# Patient Record
Sex: FEMALE | Race: WHITE | NOT HISPANIC OR LATINO | Employment: OTHER | URBAN - METROPOLITAN AREA
[De-identification: names, ages, dates, MRNs, and addresses within clinical notes are randomized per-mention and may not be internally consistent; named-entity substitution may affect disease eponyms.]

---

## 2017-04-26 ENCOUNTER — TELEPHONE (OUTPATIENT)
Dept: TRANSPLANT | Facility: CLINIC | Age: 42
End: 2017-04-26

## 2017-04-26 NOTE — TELEPHONE ENCOUNTER
Pt records reviewed.  Pt will be referred to Hepatology due to Cholestatis  Initial referral received  From Kevin Salazar's  office.   Referral letter sent to provider and patient.  Pt records reviewed.

## 2017-04-26 NOTE — LETTER
April 26, 2017    Elinor Rojas  [address]  [place] [address]      Dear Elinor Rojas:    Your doctor has referred you to the [facility]. You will be contacted by our office and an initial appointment will then be scheduled for you.    We look forward to seeing you soon. If you have any further questions, please contact us at [phone].       Sincerely,        [facility]   [address] [place] [address] [phone]

## 2017-04-26 NOTE — LETTER
April 26, 2017    Kevin Salazar MD  [address]  [facility] [address]      Dear Dr. Salazar    Patient: Elinor Rojas   MR Number: [id number]   YOB: 1975     Thank you for the referral of Elinor Rojas to the [facility]. An initial appointment will be scheduled for your patient with one of our Hepatologists.      Thank you again for your trust in our program.  If there is anything we can do for you or your staff, please feel free to contact us.        Sincerely,        [facility]  [address] [place] [id number]  [phone]

## 2017-04-28 ENCOUNTER — TELEPHONE (OUTPATIENT)
Dept: TRANSPLANT | Facility: CLINIC | Age: 42
End: 2017-04-28

## 2017-04-28 NOTE — TELEPHONE ENCOUNTER
----- Message from Sandra Aguirre sent at 4/27/2017  5:18 PM CDT -----  Pt states that she's going to [place].

## 2017-11-28 ENCOUNTER — LAB VISIT (OUTPATIENT)
Dept: LAB | Facility: HOSPITAL | Age: 42
End: 2017-11-28
Attending: INTERNAL MEDICINE
Payer: MEDICARE

## 2017-11-28 ENCOUNTER — OFFICE VISIT (OUTPATIENT)
Dept: HEPATOLOGY | Facility: CLINIC | Age: 42
End: 2017-11-28
Payer: MEDICARE

## 2017-11-28 ENCOUNTER — TELEPHONE (OUTPATIENT)
Dept: HEPATOLOGY | Facility: CLINIC | Age: 42
End: 2017-11-28

## 2017-11-28 VITALS
DIASTOLIC BLOOD PRESSURE: 79 MMHG | RESPIRATION RATE: 20 BRPM | HEIGHT: 66 IN | WEIGHT: 293 LBS | BODY MASS INDEX: 47.09 KG/M2 | TEMPERATURE: 98 F | OXYGEN SATURATION: 98 % | HEART RATE: 64 BPM | SYSTOLIC BLOOD PRESSURE: 166 MMHG

## 2017-11-28 DIAGNOSIS — R74.8 ELEVATED LIVER ENZYMES: Primary | ICD-10-CM

## 2017-11-28 DIAGNOSIS — K83.1 CHOLESTASIS: ICD-10-CM

## 2017-11-28 DIAGNOSIS — L29.9 PRURITUS: ICD-10-CM

## 2017-11-28 DIAGNOSIS — R74.8 ELEVATED LIVER ENZYMES: ICD-10-CM

## 2017-11-28 DIAGNOSIS — K74.00 HEPATIC FIBROSIS: ICD-10-CM

## 2017-11-28 DIAGNOSIS — K76.0 FATTY LIVER DISEASE, NONALCOHOLIC: ICD-10-CM

## 2017-11-28 LAB
ALBUMIN SERPL BCP-MCNC: 3.6 G/DL
ALP SERPL-CCNC: 170 U/L
ALT SERPL W/O P-5'-P-CCNC: 47 U/L
AST SERPL-CCNC: 43 U/L
BILIRUB DIRECT SERPL-MCNC: 0.4 MG/DL
BILIRUB SERPL-MCNC: 0.9 MG/DL
BUN SERPL-MCNC: 15 MG/DL
CREAT SERPL-MCNC: 0.8 MG/DL
EST. GFR  (AFRICAN AMERICAN): >60 ML/MIN/1.73 M^2
EST. GFR  (NON AFRICAN AMERICAN): >60 ML/MIN/1.73 M^2
IGA SERPL-MCNC: 250 MG/DL
IGG SERPL-MCNC: 1302 MG/DL
IGM SERPL-MCNC: 70 MG/DL
INR PPP: 0.9
PROT SERPL-MCNC: 8.1 G/DL
PROTHROMBIN TIME: 9.7 SEC

## 2017-11-28 PROCEDURE — 82787 IGG 1 2 3 OR 4 EACH: CPT

## 2017-11-28 PROCEDURE — 86038 ANTINUCLEAR ANTIBODIES: CPT

## 2017-11-28 PROCEDURE — 84520 ASSAY OF UREA NITROGEN: CPT

## 2017-11-28 PROCEDURE — 86235 NUCLEAR ANTIGEN ANTIBODY: CPT | Mod: 91

## 2017-11-28 PROCEDURE — 86256 FLUORESCENT ANTIBODY TITER: CPT

## 2017-11-28 PROCEDURE — 80076 HEPATIC FUNCTION PANEL: CPT

## 2017-11-28 PROCEDURE — 82784 ASSAY IGA/IGD/IGG/IGM EACH: CPT | Mod: 59

## 2017-11-28 PROCEDURE — 82565 ASSAY OF CREATININE: CPT

## 2017-11-28 PROCEDURE — 99204 OFFICE O/P NEW MOD 45 MIN: CPT | Mod: S$PBB,,, | Performed by: INTERNAL MEDICINE

## 2017-11-28 PROCEDURE — 99214 OFFICE O/P EST MOD 30 MIN: CPT | Mod: PBBFAC | Performed by: INTERNAL MEDICINE

## 2017-11-28 PROCEDURE — 99999 PR PBB SHADOW E&M-EST. PATIENT-LVL IV: CPT | Mod: PBBFAC,,, | Performed by: INTERNAL MEDICINE

## 2017-11-28 PROCEDURE — 85610 PROTHROMBIN TIME: CPT

## 2017-11-28 RX ORDER — OMEPRAZOLE 20 MG/1
20 TABLET, DELAYED RELEASE ORAL DAILY
COMMUNITY

## 2017-11-28 RX ORDER — HYDROXYZINE HYDROCHLORIDE 25 MG/1
25 TABLET, FILM COATED ORAL EVERY 6 HOURS PRN
COMMUNITY

## 2017-11-28 RX ORDER — ALPRAZOLAM 1 MG/1
1 TABLET ORAL 2 TIMES DAILY PRN
COMMUNITY
Start: 2017-11-08

## 2017-11-28 NOTE — PROGRESS NOTES
[facility]    CC: Abnormal liver tests and pruritus    HPI 42 y.o. female here with mild, persistent, fluctuating abnormal liver tests associated with pruritus.  This all began last March, when she had the onset of severe itching with subsequent scleral icterus.  Prior to that, there was no significant health issues and she denies the use of any supplements, heavy ETOH use, illicit drugs, or any other disposing factors to acute liver injury.  She did take Bactrim for a sore throat at some point.  After he symptoms started, she underwent a thorough workup including MRCP, liver biopsy, and serologies for causes cirrhosis.  She was told she likely had NAFLD and was instructed to loose weight and improve her lifestyle.  Since the, she has had several flares of itching and she continues to have mild pruritus at this time. She denies any overt GI bleeding, confusion, abdominal distension, current jaundice, or any other related complaints at this time.    Liver biopsy from 3/23/17: subacute cholestasis with periportal fibrosis with no evidence of cirrhosis.     Past records reviewed.  Collateral information obtained from family in the room.       Past Medical History:  Anxiety  Vit D deficiency    Past Surgical History  Cholecystectomy  MICHA    Social History  Social History   Substance Use Topics    Smoking status: Current Some Day Smoker     Packs/day: 2.00     Types: Cigarettes     Start date: 1995    Smokeless tobacco: Never Used      Comment: Lives in [facility]    Alcohol use Not on file         Family History  Mother who  of JORDAN cirrhosis    Review of Systems  General ROS: negative for chills, fever or weight loss  Psychological ROS: negative for hallucination, depression or suicidal ideation  Ophthalmic ROS: negative for blurry vision, photophobia or eye pain  ENT ROS: negative for epistaxis, sore throat or rhinorrhea  Respiratory ROS: no cough, shortness of breath, or  "wheezing  Cardiovascular ROS: no chest pain or dyspnea on exertion  Gastrointestinal ROS: mild, intermittent abdominal pain, no change in bowel habits, or black/ bloody stools  Genito-Urinary ROS: no dysuria, trouble voiding, or hematuria  Musculoskeletal ROS: negative for gait disturbance or muscular weakness  Neurological ROS: no syncope or seizures; no ataxia  Dermatological ROS:positive for pruritus, no rash or jaundice at this time    Physical Examination  BP (!) 166/79 (BP Location: Left arm, Patient Position: Sitting, BP Method: X-Large (Automatic))   Pulse 64   Temp 97.8 °F (36.6 °C) (Oral)   Resp 20   Ht 5' 6" (1.676 m)   Wt (!) 141.6 kg (312 lb 2.7 oz)   SpO2 98%   BMI 50.39 kg/m²   General appearance: alert, cooperative, no distress  HENT: Normocephalic, atraumatic, neck symmetrical, no nasal discharge   Eyes: conjunctivae/corneas clear, PERRL, EOM's intact  Lungs: clear to auscultation bilaterally, no dullness to percussion bilaterally  Heart: regular rate and rhythm without rub; no displacement of the PMI   Abdomen: soft, non-tender; bowel sounds normoactive; no organomegaly  Extremities: extremities symmetric; no clubbing, cyanosis, or edema  Integument: Skin color, texture, turgor normal; no rashes; hair distrubution normal  Neurologic: Alert and oriented X 3, normal strength, normal coordination and gait  Psychiatric: no pressured speech; normal affect; no evidence of impaired cognition     Labs: 10/26  H/H: 13/39  Plt: 346  AST/ALT: 91/163  T bili: 1.4  Alk Phos: 163  Ferritin: 104  ASMA: negative  AMA: negative  Ceruloplasmin : normal  Viral hepatitis studies: normal    Imaging: MRCP reviewed from 3/2017 and was normal.     Assessment:   42 year old female with abnormal liver tests associated with pruritus and fibrosis noted on biopsy.  Based on the available information, the most likely etiology of her abnormal liver tests is JORDAN, although seronegative PBC cannot be excluded.  We will plan " to repeat liver tests and AMA/ASMA today and will review previous liver biopsy with pathology here.  Depending on the findings and quality of the sample, repeat liver biopsy may be necessary. She was counseled on weight loss and an overall healthy lifestyle given that there is some component of JORDAN.    Plan:   1. Repeat liver tests and autoimmune serologies today  2. Obtain biopsy samples and review with pathology

## 2017-11-28 NOTE — PATIENT INSTRUCTIONS
- please get lab works done today  - will obtain liver biopsy slides and review at [facility]  - Dr. Lovett will call you with the result  - return in 3 weeks to discuss the results    - life-style modifications: weight loss, daily exercise (30 mins of HIIT or cardio), low carb/low fat diet  - control of diabetes or insulin resistance   - control of high cholesterol, if needed with statin drugs or other cholesterol-lowering agents  - coffee consumption (low caloric): 2-3 cups per day may reduce liver fat

## 2017-11-28 NOTE — LETTER
November 28, 2017        Roe Christensen MD  [address]  [place] [address]             [facility] - Hepatology  [address]  [place] [address]  Phone: [phone]  Fax: [phone]   Patient: Elinor Rojsa   MR Number: [id number]   YOB: 1975   Date of Visit: 11/28/2017       Dear Dr. Christensen:    Thank you for referring Elinor Rojas to me for evaluation. Attached you will find relevant portions of my assessment and plan of care.    If you have questions, please do not hesitate to call me. I look forward to following Elinor Rojas along with you.    Sincerely,      Sera Lovett MD            CC  No Recipients    Enclosure

## 2017-11-28 NOTE — TELEPHONE ENCOUNTER
FAXED AUHTORIZATION FOR RELEASE OF INFO TO THE PATHOLOGY LAB REQUESTING LIVER BIOPSY SLIDES TO BE MAIL TO US    PHONE# [phone]  FAX# [phone]    FED EX INFO WAS GIVEN. RENEA

## 2017-11-29 LAB
ANA SER QL IF: NORMAL
MITOCHONDRIA AB TITR SER IF: NORMAL {TITER}
SMOOTH MUSCLE AB TITR SER IF: NORMAL {TITER}

## 2017-11-30 LAB — IGG4 SER-MCNC: 21 MG/DL

## 2017-12-01 ENCOUNTER — TELEPHONE (OUTPATIENT)
Dept: HEPATOLOGY | Facility: CLINIC | Age: 42
End: 2017-12-01

## 2017-12-01 DIAGNOSIS — K74.00 HEPATIC FIBROSIS: ICD-10-CM

## 2017-12-01 DIAGNOSIS — K83.1 CHOLESTASIS: ICD-10-CM

## 2017-12-01 DIAGNOSIS — K76.0 NAFLD (NONALCOHOLIC FATTY LIVER DISEASE): ICD-10-CM

## 2017-12-01 DIAGNOSIS — R74.8 ELEVATED LIVER ENZYMES: Primary | ICD-10-CM

## 2017-12-02 NOTE — TELEPHONE ENCOUNTER
LIVER BIOPSY SLIDES X 8. -2676. COLLECTED ON 3/23/2017. NEEDED FOR 2ND OPINION. THE PATHOLOGY LABORATORY. [address] [facility] [place] [address]. PH# [phone] AND FAX # [phone].  FED EX # [id number]. SLIDES TO [facility] W/PATH REPORT.    Copy to Dr Sera Lovett, Carolyn Dumont RN and original to media.

## 2017-12-05 PROCEDURE — 88321 CONSLTJ&REPRT SLD PREP ELSWR: CPT | Mod: ,,, | Performed by: PATHOLOGY

## 2017-12-21 ENCOUNTER — CONFERENCE (OUTPATIENT)
Dept: TRANSPLANT | Facility: CLINIC | Age: 42
End: 2017-12-21

## 2017-12-22 ENCOUNTER — TELEPHONE (OUTPATIENT)
Dept: HEPATOLOGY | Facility: CLINIC | Age: 42
End: 2017-12-22

## 2017-12-22 ENCOUNTER — TELEPHONE (OUTPATIENT)
Dept: TRANSPLANT | Facility: CLINIC | Age: 42
End: 2017-12-22

## 2017-12-22 DIAGNOSIS — K74.3 PRIMARY BILIARY CHOLANGITIS: Primary | ICD-10-CM

## 2017-12-22 DIAGNOSIS — R74.8 ELEVATED LIVER ENZYMES: Primary | ICD-10-CM

## 2017-12-22 RX ORDER — URSODIOL 500 MG/1
500 TABLET, FILM COATED ORAL 2 TIMES DAILY
Qty: 60 TABLET | Refills: 11 | Status: SHIPPED | OUTPATIENT
Start: 2017-12-22 | End: 2018-02-02

## 2017-12-22 NOTE — TELEPHONE ENCOUNTER
Called to inform of lab results and liver pathology review ( Path Conference). No answer, left a voice mail.    Recommend:  - labs: ceruloplasmin due to some copper deposits found in biopsy  - start on ursodiol 500 mg BID due to elevated alkaline phosphatase, suspicion for PBC    Patient's pharmacy is not listed. I was not able to send the prescription.

## 2017-12-22 NOTE — TELEPHONE ENCOUNTER
Spoke with patient, informed her of labs/path conference. Will start on UDCA 500 mg BID, patient expressed understanidng. Script sent to [facility] in [place], [place] .    Return in 1 month with labs.

## 2017-12-22 NOTE — TELEPHONE ENCOUNTER
12/21/17: Liver Pathology Conference:    Liver Biopsy: focal area of ballooning/ not much fibrosis/ minimal copper/ ?? BD obstruction

## 2018-01-03 DIAGNOSIS — R74.8 ELEVATED LIVER ENZYMES: Primary | ICD-10-CM

## 2018-01-03 DIAGNOSIS — K76.0 FATTY LIVER DISEASE, NONALCOHOLIC: ICD-10-CM

## 2018-01-03 DIAGNOSIS — K74.00 HEPATIC FIBROSIS: ICD-10-CM

## 2018-01-03 DIAGNOSIS — K83.1 CHOLESTASIS: ICD-10-CM

## 2018-02-02 ENCOUNTER — LAB VISIT (OUTPATIENT)
Dept: LAB | Facility: HOSPITAL | Age: 43
End: 2018-02-02
Attending: INTERNAL MEDICINE
Payer: MEDICARE

## 2018-02-02 ENCOUNTER — OFFICE VISIT (OUTPATIENT)
Dept: HEPATOLOGY | Facility: CLINIC | Age: 43
End: 2018-02-02
Payer: MEDICARE

## 2018-02-02 VITALS
BODY MASS INDEX: 47.09 KG/M2 | OXYGEN SATURATION: 97 % | HEART RATE: 85 BPM | RESPIRATION RATE: 18 BRPM | SYSTOLIC BLOOD PRESSURE: 164 MMHG | DIASTOLIC BLOOD PRESSURE: 82 MMHG | HEIGHT: 66 IN | WEIGHT: 293 LBS

## 2018-02-02 DIAGNOSIS — H04.123 DRY EYES: ICD-10-CM

## 2018-02-02 DIAGNOSIS — K76.0 FATTY LIVER DISEASE, NONALCOHOLIC: ICD-10-CM

## 2018-02-02 DIAGNOSIS — K83.1 CHOLESTASIS: ICD-10-CM

## 2018-02-02 DIAGNOSIS — L29.9 PRURITUS: ICD-10-CM

## 2018-02-02 DIAGNOSIS — R74.8 ELEVATED LIVER ENZYMES: ICD-10-CM

## 2018-02-02 DIAGNOSIS — K74.3 PRIMARY BILIARY CHOLANGITIS: Primary | ICD-10-CM

## 2018-02-02 DIAGNOSIS — K74.00 HEPATIC FIBROSIS: ICD-10-CM

## 2018-02-02 LAB
A1AT SERPL-MCNC: 144 MG/DL
ALBUMIN SERPL BCP-MCNC: 3.8 G/DL
ALP SERPL-CCNC: 153 U/L
ALT SERPL W/O P-5'-P-CCNC: 26 U/L
ANION GAP SERPL CALC-SCNC: 12 MMOL/L
AST SERPL-CCNC: 30 U/L
BASOPHILS # BLD AUTO: 0.09 K/UL
BASOPHILS NFR BLD: 0.9 %
BILIRUB SERPL-MCNC: 1.4 MG/DL
BUN SERPL-MCNC: 17 MG/DL
CALCIUM SERPL-MCNC: 9.5 MG/DL
CERULOPLASMIN SERPL-MCNC: 29 MG/DL
CHLORIDE SERPL-SCNC: 104 MMOL/L
CO2 SERPL-SCNC: 26 MMOL/L
CREAT SERPL-MCNC: 0.8 MG/DL
DIFFERENTIAL METHOD: ABNORMAL
EOSINOPHIL # BLD AUTO: 0.3 K/UL
EOSINOPHIL NFR BLD: 2.8 %
ERYTHROCYTE [DISTWIDTH] IN BLOOD BY AUTOMATED COUNT: 12.7 %
EST. GFR  (AFRICAN AMERICAN): >60 ML/MIN/1.73 M^2
EST. GFR  (NON AFRICAN AMERICAN): >60 ML/MIN/1.73 M^2
GLUCOSE SERPL-MCNC: 94 MG/DL
HCT VFR BLD AUTO: 41.2 %
HGB BLD-MCNC: 14.1 G/DL
IMM GRANULOCYTES # BLD AUTO: 0.04 K/UL
IMM GRANULOCYTES NFR BLD AUTO: 0.4 %
INR PPP: 0.9
LYMPHOCYTES # BLD AUTO: 3.7 K/UL
LYMPHOCYTES NFR BLD: 36.1 %
MCH RBC QN AUTO: 28.8 PG
MCHC RBC AUTO-ENTMCNC: 34.2 G/DL
MCV RBC AUTO: 84 FL
MONOCYTES # BLD AUTO: 0.6 K/UL
MONOCYTES NFR BLD: 5.5 %
NEUTROPHILS # BLD AUTO: 5.5 K/UL
NEUTROPHILS NFR BLD: 54.3 %
NRBC BLD-RTO: 0 /100 WBC
PLATELET # BLD AUTO: 354 K/UL
PMV BLD AUTO: 10.6 FL
POTASSIUM SERPL-SCNC: 3.8 MMOL/L
PROT SERPL-MCNC: 8.4 G/DL
PROTHROMBIN TIME: 9.9 SEC
RBC # BLD AUTO: 4.89 M/UL
SODIUM SERPL-SCNC: 142 MMOL/L
WBC # BLD AUTO: 10.18 K/UL

## 2018-02-02 PROCEDURE — 85610 PROTHROMBIN TIME: CPT

## 2018-02-02 PROCEDURE — 85025 COMPLETE CBC W/AUTO DIFF WBC: CPT

## 2018-02-02 PROCEDURE — 80053 COMPREHEN METABOLIC PANEL: CPT

## 2018-02-02 PROCEDURE — 82390 ASSAY OF CERULOPLASMIN: CPT

## 2018-02-02 PROCEDURE — 99999 PR PBB SHADOW E&M-EST. PATIENT-LVL IV: CPT | Mod: PBBFAC,,, | Performed by: INTERNAL MEDICINE

## 2018-02-02 PROCEDURE — 99214 OFFICE O/P EST MOD 30 MIN: CPT | Mod: PBBFAC | Performed by: INTERNAL MEDICINE

## 2018-02-02 PROCEDURE — 36415 COLL VENOUS BLD VENIPUNCTURE: CPT

## 2018-02-02 PROCEDURE — 99215 OFFICE O/P EST HI 40 MIN: CPT | Mod: S$PBB,,, | Performed by: INTERNAL MEDICINE

## 2018-02-02 RX ORDER — URSODIOL 300 MG/1
300 CAPSULE ORAL 2 TIMES DAILY
Qty: 60 CAPSULE | Refills: 11 | Status: SHIPPED | OUTPATIENT
Start: 2018-02-02 | End: 2019-02-15 | Stop reason: SDUPTHER

## 2018-02-02 NOTE — PROGRESS NOTES
"Hepatology Note    Referring provider: Kevin Salazar MD    Lab: Outpatient Lab Draw Station: [address] [facility] [address], [place] [address]  Phone: [phone]    Chief Complaint   Patient presents with    Follow-up       HPI:  Elinor Rojas is a pleasant 42 y.o. femalewho was referred to Hepatology Clinic for consultation of had concerns including Follow-up..      2/2/18:   Reviewed [facility] conference review with patient, explained to her that she likely has "seronegative PBC". Patient tried UDCA 500 mg BID and could not tolerate due to abdominal pain, now taking only once a day - still with some abd pain, denies n/v. Feeling itching, fatigue and dry eyes. Denies GI bleed, leg edema, confusion.    FINAL PATHOLOGIC DIAGNOSIS  Outside slides ([facility], procedure date: 3/23/2017)  Liver, random, biopsy:  - Low-grade ductular reaction with mild portal neutrophilia and minimal periportal copper deposition.  - Minimal steatosis, macrovesicular 5% and microvesicular 10%.  - Features of steatohepatitis with pericellular fibrosis and occasional ballooning hepatocyte.  - Periportal and pericellular fibrosis with septae formation, stage 2 (of 4).  - See comment.  COMMENT: The biopsy is adequate for evaluation. The biopsy shows a low-grade ductular reaction with mild portal  neutrophilia seen on CK 7 and immunostain. There is minimal periportal copper deposition on copper stain  indicating some degree of chronicity. There is no ductopenia seen. There are no granulomas seen. There is no  lymphocytic cholangitis seen. The differential diagnosis includes all various causes of mechanical (stones,  strictures, neoplasm, etc.) and/or immunologic (PSC, PBC, DILI, etc.) causes of suboptimal biliary drainage.  Although there is minimal steatosis seen on the biopsy, there are features indicating a degree of steatohepatitis  present with pericellular fibrosis and occasional ballooning hepatocytes. Trichrome stain shows " portal and  pericellular fibrosis with septae. PASD stain is negative. There is no increased iron on iron stain    Patient Active Problem List   Diagnosis    Class 3 obesity due to excess calories with serious comorbidity in adult    Elevated liver enzymes    Hepatic fibrosis    Fatty liver disease, nonalcoholic    Cholestasis    Pruritus       Past Medical History:   Diagnosis Date    GERD (gastroesophageal reflux disease)     Itching        History reviewed. No pertinent surgical history.    History reviewed. No pertinent family history.    Social History     Social History    Marital status:      Spouse name: N/A    Number of children: N/A    Years of education: N/A     Social History Main Topics    Smoking status: Current Some Day Smoker     Packs/day: 2.00     Types: Cigarettes     Start date: 11/28/1995    Smokeless tobacco: Never Used      Comment: Lives in [facility]    Alcohol use None    Drug use: Unknown    Sexual activity: Not Asked     Other Topics Concern    None     Social History Narrative    None       Current Outpatient Prescriptions   Medication Sig Dispense Refill    omeprazole (PRILOSEC OTC) 20 MG tablet Take 20 mg by mouth once daily.      ursodiol (ACTIGALL) 500 MG tablet Take 1 tablet (500 mg total) by mouth 2 (two) times daily. 60 tablet 11    ALPRAZolam (XANAX) 1 MG tablet Take 1 mg by mouth 2 (two) times daily as needed.      hydrOXYzine HCl (ATARAX) 25 MG tablet Take 25 mg by mouth every 6 (six) hours as needed for Itching.       No current facility-administered medications for this visit.        Review of patient's allergies indicates:   Allergen Reactions    Thorazine [chlorpromazine] Other (See Comments)     Medicine causes her to go in a trance and just stare.        Review of Systems   Constitutional: Positive for malaise/fatigue. Negative for chills, fever and weight loss.   HENT: Negative for congestion, nosebleeds and sore throat.    Eyes:  "Negative for blurred vision, double vision and photophobia.   Respiratory: Negative for cough and shortness of breath.    Cardiovascular: Negative for chest pain, palpitations, orthopnea and leg swelling.   Gastrointestinal: Negative for abdominal pain, blood in stool, constipation, diarrhea, melena and vomiting.   Genitourinary: Negative for dysuria.   Musculoskeletal: Negative for falls and joint pain.   Skin: Positive for itching. Negative for rash.   Neurological: Negative for dizziness, tremors and weakness.   Endo/Heme/Allergies: Does not bruise/bleed easily.   Psychiatric/Behavioral: Negative for depression and substance abuse. The patient is not nervous/anxious and does not have insomnia.        Vitals:    02/02/18 1430   BP: (!) 164/82   Pulse: 85   Resp: 18   SpO2: 97%   Weight: (!) 145 kg (319 lb 10.7 oz)   Height: 5' 6" (1.676 m)       Physical Exam   Constitutional: She is oriented to person, place, and time. She appears well-developed and well-nourished.   HENT:   Head: Normocephalic and atraumatic.   Mouth/Throat: Oropharynx is clear and moist.   Eyes: Conjunctivae are normal. No scleral icterus.   Neck: Normal range of motion.   Cardiovascular: Normal rate, regular rhythm and normal heart sounds.    Pulmonary/Chest: Effort normal and breath sounds normal. No respiratory distress. She has no rales.   Abdominal: Soft. Bowel sounds are normal. She exhibits no distension. There is no tenderness. No hernia.   Musculoskeletal: She exhibits no edema.   Lymphadenopathy:     She has no cervical adenopathy.   Neurological: She is alert and oriented to person, place, and time.   Skin: Skin is warm and dry. No rash noted.   Psychiatric: She has a normal mood and affect. Her behavior is normal. Her mood appears not anxious.   Nursing note and vitals reviewed.      LABS: I personally reviewed pertinent laboratory findings.    Lab Results   Component Value Date    ALT 26 02/02/2018    AST 30 02/02/2018    ALKPHOS " 153 (H) 02/02/2018    BILITOT 1.4 (H) 02/02/2018       No results found for: WBC, HGB, HCT, MCV, PLT    Lab Results   Component Value Date    BUN 15 11/28/2017    CREATININE 0.8 11/28/2017    ESTGFRAFRICA >60.0 11/28/2017    EGFRNONAA >60.0 11/28/2017       No results found for: HAV, HEPAIGM, HEPBIGM, HEPBCAB, HBEAG, HEPCAB    Lab Results   Component Value Date    SMOOTHMUSCAB Negative 1:40 11/28/2017       I personally reviewed all recent lab results.  I personally reviewed imaging studies.    Assessment:  42 y.o. female presenting with     1. Primary biliary cholangitis    2. Cholestasis    3. Class 3 obesity due to excess calories with serious comorbidity in adult    4. Pruritus    5. Hepatic fibrosis    6. Elevated liver enzymes    7. Fatty liver disease, nonalcoholic    8. Dry eyes      AMA: neg  However, the liver biopsy was reviewed here at [facility], cholestasis and ductular inflammation: likely PBC. She does have steatosis and steatohepatitis but mild. Fibrosis was determined stage 2.  She does have fatigue, pruritus and dry eyes which support the diagnosis of seronegative PBC.  She has started UDCA 500 mg BID - appropriate weight-based dosing, however experiencing side effect with abd pain, she decreased it to 500 mg QD and pain is better but still there.     -> 153     Recommendation(s):  - decrease UDCA dose to 300 mg BID, if tolerates  - if cannot tolerate UDCA, plan to stop it and start OCA  - artifical tear prn for dry eyes  - counseled for PBC and NAFLD  - avoid alcohol  - RTC 3 months, labs in 1 month    A total of 60 minutes were spent face-to-face with the patient during this encounter and over half of that time was spent on counseling and coordination of care.  We discussed in depth the nature of the patient's liver disease, the management plan in details. I also educated the patient about lifestyle modifications which may improve hepatic steatosis, overweight/obesity, insulin  resistance and high blood pressure issues. I have provided the patient with an opportunity to ask questions and have all questions answered to his satisfaction.     I have sent communication to the referring physician and/or primary care provider.    Sera Lovett MD  Staff Physician  Hepatology and Liver Transplant  [facility]  [facility]

## 2018-02-02 NOTE — PATIENT INSTRUCTIONS
- fill lower dose of Ursodiol - 300 mg - start 1 time daily, if tolerable, increase to twice daily  - stop ursodiol 500 mg  - call Dr. Lovett or email via [email or web address] and inform of side effect  - if you continue to have side effect which is abdominal pain, will try to switch to Ocaliva, new medication  - you may use artificial eye drops for dry eyes

## 2018-02-12 ENCOUNTER — TELEPHONE (OUTPATIENT)
Dept: TRANSPLANT | Facility: CLINIC | Age: 43
End: 2018-02-12

## 2018-02-12 NOTE — TELEPHONE ENCOUNTER
Will send lab orders to patient's local lab:    Lab: Outpatient Lab Draw Station: [address] [place] [address] [address], [place] [address]  Phone: [phone]

## 2018-02-13 ENCOUNTER — PATIENT MESSAGE (OUTPATIENT)
Dept: HEPATOLOGY | Facility: CLINIC | Age: 43
End: 2018-02-13

## 2018-02-21 ENCOUNTER — PATIENT MESSAGE (OUTPATIENT)
Dept: HEPATOLOGY | Facility: CLINIC | Age: 43
End: 2018-02-21

## 2018-02-21 LAB
ALBUMIN/GLOBULIN RATIO: 1.2
ALBUMIN: 4.3
ALP ISOS SERPL LEV INH-CCNC: 143 U/L
ALT SERPL-CCNC: 14 U/L
ANION GAP SERPL CALC-SCNC: 13 MMOL/L
AST SERPL-CCNC: 20 U/L
BASOPHILS ABSOLUTE COUNT: 0 /ΜL
BASOPHILS NFR BLD: 1 % (ref 0–3)
BILIRUBIN, TOTAL: 0.8
BUN BLD-MCNC: 13 MG/DL (ref 4–21)
BUN/CREAT SERPL: 23.6
CALCIUM SERPL-MCNC: 8.9 MG/DL
CARBON DIOXIDE, CO2: 27
CHLORIDE: 99
CREAT SERPL-MCNC: 0.6 MG/DL
EOSINOPHIL NFR BLD: 2.2 %
EOSINOPHILS ABSOLUTE COUNT: 0 /ΜL
ERYTHROCYTE [DISTWIDTH] IN BLOOD BY AUTOMATED COUNT: 39.2 %
GFR: 122.42
GLOBULIN: 3.6
GLUCOSE: 83
HCT VFR BLD AUTO: 41 % (ref 36–46)
HGB BLD-MCNC: 13.7 G/DL (ref 12–16)
IMMATURE GRANS (ABS): 0.02
IMMATURE GRANULOCYTES: 0.2
LYMPH%: 34 % (ref 18–52)
LYMPHOCYTES ABSOLUTE COUNT: 4 /?L
MCH RBC QN AUTO: 28.8 PG
MCHC RBC AUTO-ENTMCNC: 33.5 G/DL
MCV RBC AUTO: 86.1 FL
MONO%: 5 % (ref 3–10)
MONOCYTES ABSOLUTE COUNT: 0.52
NEUTROPHILS - MAN (DIFF): 58
NEUTROPHILS ABSOLUTE COUNT: 6 /ΜL
NRBC: 0
NUCLEATED RBC-MANUAL: 0
PLATELET # BLD AUTO: 412 K/ΜL (ref 150–399)
POTASSIUM: 4.1
PROT SERPL-MCNC: 7.9 G/DL
RBC # BLD AUTO: 4.75 10*6/UL
SODIUM: 139
VIT A SERPL-MCNC: 40 UG/ML
VITAMIN D, 25-OH, TOTAL: 17.3
WBC: 10.7

## 2018-03-14 ENCOUNTER — PATIENT MESSAGE (OUTPATIENT)
Dept: HEPATOLOGY | Facility: CLINIC | Age: 43
End: 2018-03-14

## 2018-03-15 ENCOUNTER — TELEPHONE (OUTPATIENT)
Dept: BARIATRICS | Facility: CLINIC | Age: 43
End: 2018-03-15

## 2018-03-16 ENCOUNTER — PATIENT MESSAGE (OUTPATIENT)
Dept: HEPATOLOGY | Facility: CLINIC | Age: 43
End: 2018-03-16

## 2018-03-16 NOTE — TELEPHONE ENCOUNTER
----- Message from Magdalena GRIDER Ephraim sent at 3/15/2018  5:36 PM CDT -----  Contact: PT Portal Request  Appointment Request From: Elinor Rojas    With Provider: Other - (see comments)    Would Accept With:Only the person I've selected    Preferred Date Range: From 3/20/2018 To 3/23/2018    Preferred Times: Any    Reason for visit: Request an Appt    Comments:  I have attended the Bariatric Surgery Seminar [place] Orientation. I need to schedule my consult visit. I was not able to recieve my packet to complete at home.

## 2018-03-16 NOTE — TELEPHONE ENCOUNTER
Called patient to schedule consult appointment. Patient understands date, time and location of future appointment.

## 2019-02-15 DIAGNOSIS — K74.3 PRIMARY BILIARY CHOLANGITIS: ICD-10-CM

## 2019-02-17 RX ORDER — URSODIOL 300 MG/1
CAPSULE ORAL
Qty: 60 CAPSULE | Refills: 11 | Status: SHIPPED | OUTPATIENT
Start: 2019-02-17

## 2022-07-28 ENCOUNTER — TELEPHONE (OUTPATIENT)
Dept: OBSTETRICS AND GYNECOLOGY | Facility: CLINIC | Age: 47
End: 2022-07-28
Payer: MEDICARE

## 2022-07-28 NOTE — TELEPHONE ENCOUNTER
----- Message from Lali Moreno NP sent at 7/28/2022 12:22 PM CDT -----  Regarding: pelvic US and appt please  This pt was seen in [place] today   She is a past pt of Dr. Loyd   She needs pelvic US with TVP and apt to see dr Loyd please  Thanks  Lali FERGUSON

## 2022-07-29 DIAGNOSIS — R10.2 PELVIC PAIN: Primary | ICD-10-CM

## 2022-08-01 DIAGNOSIS — R10.2 PELVIC PAIN: Primary | ICD-10-CM

## 2022-08-02 ENCOUNTER — PROCEDURE VISIT (OUTPATIENT)
Dept: OBSTETRICS AND GYNECOLOGY | Facility: CLINIC | Age: 47
End: 2022-08-02
Payer: MEDICARE

## 2022-08-02 ENCOUNTER — PATIENT MESSAGE (OUTPATIENT)
Dept: OBSTETRICS AND GYNECOLOGY | Facility: CLINIC | Age: 47
End: 2022-08-02

## 2022-08-02 ENCOUNTER — OFFICE VISIT (OUTPATIENT)
Dept: OBSTETRICS AND GYNECOLOGY | Facility: CLINIC | Age: 47
End: 2022-08-02
Payer: MEDICARE

## 2022-08-02 VITALS
WEIGHT: 274 LBS | BODY MASS INDEX: 44.22 KG/M2 | HEART RATE: 99 BPM | SYSTOLIC BLOOD PRESSURE: 151 MMHG | DIASTOLIC BLOOD PRESSURE: 95 MMHG

## 2022-08-02 DIAGNOSIS — R10.2 PELVIC PAIN: ICD-10-CM

## 2022-08-02 DIAGNOSIS — R10.30 LOWER ABDOMINAL PAIN: Primary | ICD-10-CM

## 2022-08-02 PROCEDURE — 99203 OFFICE O/P NEW LOW 30 MIN: CPT | Mod: 25,S$GLB,, | Performed by: OBSTETRICS & GYNECOLOGY

## 2022-08-02 PROCEDURE — 76856 US OB/GYN PROCEDURE (VIEWPOINT): ICD-10-PCS | Mod: S$GLB,,, | Performed by: OBSTETRICS & GYNECOLOGY

## 2022-08-02 PROCEDURE — 76830 US OB/GYN PROCEDURE (VIEWPOINT): ICD-10-PCS | Mod: S$GLB,,, | Performed by: OBSTETRICS & GYNECOLOGY

## 2022-08-02 PROCEDURE — 99203 PR OFFICE/OUTPT VISIT, NEW, LEVL III, 30-44 MIN: ICD-10-PCS | Mod: 25,S$GLB,, | Performed by: OBSTETRICS & GYNECOLOGY

## 2022-08-02 PROCEDURE — 76830 TRANSVAGINAL US NON-OB: CPT | Mod: S$GLB,,, | Performed by: OBSTETRICS & GYNECOLOGY

## 2022-08-02 PROCEDURE — 76856 US EXAM PELVIC COMPLETE: CPT | Mod: S$GLB,,, | Performed by: OBSTETRICS & GYNECOLOGY

## 2022-08-02 RX ORDER — CYCLOBENZAPRINE HYDROCHLORIDE 7.5 MG/1
7.5 TABLET, FILM COATED ORAL 3 TIMES DAILY
Qty: 90 TABLET | Refills: 1 | Status: SHIPPED | OUTPATIENT
Start: 2022-08-02 | End: 2022-09-01

## 2022-08-02 NOTE — PROGRESS NOTES
Subjective:       Patient ID: Elinor Rojas is a 47 y.o. female.    Chief Complaint: Pelvic Pain and Follow-up    S a 47-year-old female who was referred for left lower quadrant abdominal pain she has a past history of having a laparoscopic supracervical hysterectomy said she had a was found to have a lot of infection at the time and she she feels the but she had her uterus tubes and ovaries removed however cervix was spared.  She never any children she has occasional UTIs with no trouble with her bowels says the pain is a sharp pain often on been common for about the past 4 months she had STD screen and which was negative KUB was negative ultrasound is negative    Review of Systems   Constitutional: Negative for activity change, appetite change, chills, fever and unexpected weight change.   HENT: Negative for nasal congestion, dental problem, facial swelling, hearing loss and mouth sores.    Respiratory: Negative for apnea, chest tightness, shortness of breath and wheezing.    Cardiovascular: Negative for chest pain and leg swelling.   Gastrointestinal: Positive for abdominal pain. Negative for abdominal distention, anal bleeding, blood in stool, constipation, diarrhea, nausea, rectal pain and vomiting.   Genitourinary: Negative for decreased urine volume, difficulty urinating, dyspareunia, dysuria, enuresis, flank pain, frequency, genital sores, hematuria, menstrual problem, pelvic pain, urgency, vaginal bleeding, vaginal discharge and vaginal pain.   Musculoskeletal: Negative for arthralgias, back pain, joint swelling, myalgias and neck pain.   Integumentary:  Negative for color change, pallor, rash and wound.   Allergic/Immunologic: Negative for immunocompromised state.   Neurological: Negative for dizziness, light-headedness and headaches.   Hematological: Negative for adenopathy. Does not bruise/bleed easily.   Psychiatric/Behavioral: Negative for agitation, behavioral problems, confusion, sleep  disturbance and suicidal ideas. The patient is not nervous/anxious and is not hyperactive.          Objective:      Physical Exam  Constitutional:       Appearance: She is well-developed.   HENT:      Head: Normocephalic.      Nose: Nose normal.   Eyes:      Conjunctiva/sclera: Conjunctivae normal.      Pupils: Pupils are equal, round, and reactive to light.   Cardiovascular:      Rate and Rhythm: Normal rate and regular rhythm.      Heart sounds: Normal heart sounds.   Pulmonary:      Effort: Pulmonary effort is normal.      Breath sounds: Normal breath sounds.   Abdominal:      General: Bowel sounds are normal.      Palpations: Abdomen is soft.      Comments: Patient is a as little bit tenderness just a little bit to the left of the midline cannot tell if shediastasis   Genitourinary:     Vagina: Normal.      Comments: Vulva vagina bimanual normal do not feel any masses the pelvis feels like it moves pretty well the tissues  Musculoskeletal:         General: Normal range of motion.      Cervical back: Normal range of motion and neck supple.   Skin:     General: Skin is warm and dry.   Neurological:      Mental Status: She is alert and oriented to person, place, and time.   Psychiatric:         Behavior: Behavior normal.         Thought Content: Thought content normal.         Assessment:       Problem List Items Addressed This Visit    None       explained to the patient that the were going to try some muscle relaxers if the pain doing better she may need to see her family doc and have a CT scan I do not think it is gyn at this time  Plan:

## 2022-08-04 ENCOUNTER — PATIENT MESSAGE (OUTPATIENT)
Dept: OBSTETRICS AND GYNECOLOGY | Facility: CLINIC | Age: 47
End: 2022-08-04
Payer: MEDICARE

## 2022-08-25 ENCOUNTER — PATIENT MESSAGE (OUTPATIENT)
Dept: OBSTETRICS AND GYNECOLOGY | Facility: CLINIC | Age: 47
End: 2022-08-25
Payer: MEDICARE